# Patient Record
Sex: FEMALE | Race: WHITE | NOT HISPANIC OR LATINO | Employment: FULL TIME | URBAN - METROPOLITAN AREA
[De-identification: names, ages, dates, MRNs, and addresses within clinical notes are randomized per-mention and may not be internally consistent; named-entity substitution may affect disease eponyms.]

---

## 2019-02-09 ENCOUNTER — HOSPITAL ENCOUNTER (EMERGENCY)
Facility: HOSPITAL | Age: 34
Discharge: HOME | End: 2019-02-09
Attending: EMERGENCY MEDICINE
Payer: COMMERCIAL

## 2019-02-09 ENCOUNTER — APPOINTMENT (EMERGENCY)
Dept: RADIOLOGY | Facility: HOSPITAL | Age: 34
End: 2019-02-09
Payer: COMMERCIAL

## 2019-02-09 VITALS
SYSTOLIC BLOOD PRESSURE: 124 MMHG | OXYGEN SATURATION: 97 % | TEMPERATURE: 98 F | DIASTOLIC BLOOD PRESSURE: 82 MMHG | HEART RATE: 83 BPM | BODY MASS INDEX: 21.82 KG/M2 | HEIGHT: 71 IN | WEIGHT: 155.87 LBS | RESPIRATION RATE: 16 BRPM

## 2019-02-09 DIAGNOSIS — M25.552 LEFT HIP PAIN: Primary | ICD-10-CM

## 2019-02-09 LAB — EXT PREG TEST URINE: NEGATIVE

## 2019-02-09 PROCEDURE — 99284 EMERGENCY DEPT VISIT MOD MDM: CPT

## 2019-02-09 PROCEDURE — 72100 X-RAY EXAM L-S SPINE 2/3 VWS: CPT

## 2019-02-09 PROCEDURE — 81025 URINE PREGNANCY TEST: CPT | Performed by: PHYSICIAN ASSISTANT

## 2019-02-09 PROCEDURE — 73521 X-RAY EXAM HIPS BI 2 VIEWS: CPT

## 2019-02-09 RX ORDER — LIDOCAINE 50 MG/G
1 PATCH TOPICAL DAILY
Qty: 30 PATCH | Refills: 0 | Status: SHIPPED | OUTPATIENT
Start: 2019-02-09

## 2019-02-09 RX ORDER — IBUPROFEN 400 MG/1
400 TABLET ORAL EVERY 6 HOURS PRN
Qty: 20 TABLET | Refills: 0 | Status: SHIPPED | OUTPATIENT
Start: 2019-02-09 | End: 2019-02-14

## 2019-02-09 RX ORDER — LIDOCAINE 50 MG/G
1 PATCH TOPICAL ONCE
Status: DISCONTINUED | OUTPATIENT
Start: 2019-02-09 | End: 2019-02-09 | Stop reason: HOSPADM

## 2019-02-09 RX ORDER — KETOROLAC TROMETHAMINE 30 MG/ML
15 INJECTION, SOLUTION INTRAMUSCULAR; INTRAVENOUS ONCE
Status: DISCONTINUED | OUTPATIENT
Start: 2019-02-09 | End: 2019-02-09 | Stop reason: HOSPADM

## 2019-02-09 RX ORDER — SENNOSIDES 8.6 MG
650 CAPSULE ORAL EVERY 8 HOURS PRN
Qty: 30 TABLET | Refills: 0 | Status: SHIPPED | OUTPATIENT
Start: 2019-02-09 | End: 2019-02-14

## 2019-02-09 RX ADMIN — LIDOCAINE 1 PATCH: 50 PATCH TOPICAL at 02:46

## 2019-02-09 NOTE — DISCHARGE INSTRUCTIONS
Take Tylenol and Motrin as indicated  Use cane as indicated  Follow-up with PTT  Follow-up with PCP  Follow up with emergency department if symptoms persist or exacerbate    Hip Pain   WHAT YOU NEED TO KNOW:   Hip pain can be caused by a number of conditions, such as bursitis, arthritis, or muscle or tendon strain  X-rays do not show broken bones  You may have swelling in the fluid-filled sacs that protect your muscles and tendons  Hip pain can also be caused by a lower back problem  Hip pain may be caused by trauma, playing sports, or running  Your pain may start in your hip and go to your thigh, buttock, or groin  DISCHARGE INSTRUCTIONS:   Medicines:   · NSAIDs , such as ibuprofen, help decrease swelling, pain, and fever  This medicine is available with or without a doctor's order  NSAIDs can cause stomach bleeding or kidney problems in certain people  If you take blood thinner medicine, always ask your healthcare provider if NSAIDs are safe for you  Always read the medicine label and follow directions  · Take your medicine as directed  Contact your healthcare provider if you think your medicine is not helping or if you have side effects  Tell him of her if you are allergic to any medicine  Keep a list of the medicines, vitamins, and herbs you take  Include the amounts, and when and why you take them  Bring the list or the pill bottles to follow-up visits  Carry your medicine list with you in case of an emergency  Return to the emergency department if:   · Your pain gets worse  · You have numbness in your leg or toes  · You cannot put any weight on or move your hip  Contact your healthcare provider if:   · You have a fever  · Your pain does not decrease, even after treatment  · You have questions or concerns about your condition or care  Follow up with your healthcare provider as directed: You may need physical therapy, an injection, or more testing   You may need to see an orthopedic specialist  Write down your questions so you remember to ask them during your visits  Manage your hip pain:   · Rest  your injured hip so that it can heal  You may need to avoid putting any weight on your hip for at least 48 hours  Return to normal activities as directed  · Ice  the injury for 20 minutes every 4 hours, or as directed  Use an ice pack, or put crushed ice in a plastic bag  Cover it with a towel to protect your skin  Ice helps prevent tissue damage and decreases swelling and pain  · Elevate  your injured hip above the level of your heart as often as you can  This will help decrease swelling and pain  If possible, prop your hip and leg on pillows or blankets to keep the area elevated comfortably  · Maintain a healthy weight  Extra body weight can cause pressure and pain in your hip, knee, and ankle joints  Ask your healthcare provider how much you should weigh  Ask him to help you create a weight loss plan if you are overweight  · Use assistive devices as directed  You may need to use a cane or crutches  Assistive devices help decrease pain and pressure on your hip when you walk  Ask your healthcare provider for more information about assistive devices and how to use them correctly  © 2017 [address] [facility] Information is for End User's use only and may not be sold, redistributed or otherwise used for commercial purposes  All illustrations and images included in CareNotes® are the copyrighted property of [employer]  or Balaji Agu[employer]  The above information is an  only  It is not intended as medical advice for individual conditions or treatments  Talk to your doctor, nurse or pharmacist before following any medical regimen to see if it is safe and effective for you  Hip Pain   WHAT YOU NEED TO KNOW:   What causes hip pain? Hip pain can be caused by a number of conditions, such as bursitis, arthritis, or muscle or tendon strain   You may have swelling in the fluid-filled sacs that protect your muscles and tendons  Hip pain can also be caused by a lower back problem  Hip pain may be caused by trauma, playing sports, or running  Your pain may start in your hip and go to your thigh, buttock, or groin  How is hip pain treated? You may need x-rays to make sure there are no broken bones  You may be given NSAIDs to help decrease pain and swelling  How can I manage hip pain? · Rest  your injured hip so that it can heal  You may need to avoid putting any weight on your hip for at least 48 hours  Return to normal activities as directed  · Ice  the injury for 20 minutes every 4 hours, or as directed  Use an ice pack, or put crushed ice in a plastic bag  Cover it with a towel to protect your skin  Ice helps prevent tissue damage and decreases swelling and pain  · Elevate  your injured hip above the level of your heart as often as you can  This will help decrease swelling and pain  If possible, prop your hip and leg on pillows or blankets to keep the area elevated comfortably  · Maintain a healthy weight  Extra body weight can cause pressure and pain in your hip, knee, and ankle joints  Ask your healthcare provider how much you should weigh  Ask him to help you create a weight loss plan if you are overweight  · Use assistive devices as directed  You may need to use a cane or crutches  Assistive devices help decrease pain and pressure on your hip when you walk  Ask your healthcare provider for more information about assistive devices and how to use them correctly  When should I seek immediate care? · Your pain gets worse  · You have numbness in your leg or toes  · You cannot put any weight on or move your hip  When should I contact my healthcare provider? · You have a fever  · Your pain does not decrease, even after treatment  · You have questions or concerns about your condition or care    CARE AGREEMENT:   You have the right to help plan your care  Learn about your health condition and how it may be treated  Discuss treatment options with your caregivers to decide what care you want to receive  You always have the right to refuse treatment  The above information is an  only  It is not intended as medical advice for individual conditions or treatments  Talk to your doctor, nurse or pharmacist before following any medical regimen to see if it is safe and effective for you  © 2017 [employer]  Information is for End User's use only and may not be sold, redistributed or otherwise used for commercial purposes  All illustrations and images included in CareNotes® are the copyrighted property of [employer]  or Balaji Aguilar

## 2019-02-09 NOTE — ED PROVIDER NOTES
History  Chief Complaint   Patient presents with    Fall     pt states she fell sown about 4-5 steps and landed on left side, c/o left lower back pain, denies N/T     Patient is a 65-year-old female history of appendectomy that presents emergency department with persistent achy nonradiating left hip pain for 2 hours  Patient states that she was walking down the steps of her home into her basement to check on her children, when she slipped, and landed on her left pelvis and slid down 4-5 stairs  Patient denies hitting her head  Patient was able to ambulate on scene  Patient presents emergency department to be evaluated  Patient verbalizes a history of lower back injury playing sports while in high school  Patient denies taking any pharmacological pain management prior to presentation  Patient affirms rest as other factor, with provocative factor of pressure to left pelvis and ambulation on left side  Patient denies fevers, chills, nausea, and vomiting  Patient denies diarrhea, constipation or urinary symptoms  Patient denies numbness, tingling, and loss of power  Patient denies loss of consciousness, vision changes, and photophobia  Patient denies sick contacts or recent travel  Patient denies chest pain, shortness of breath, and abdominal pain    Patient has acute distress        History provided by:  Patient   used: No    Hip Pain   Location:  Left hip  Quality:  Achy and dull  Severity:  Mild  Onset quality:  Sudden  Duration:  2 hours  Timing:  Constant  Progression:  Unchanged  Chronicity:  New  Context:  Patient fell down 4 to 5 stairs on left hip  Relieved by:  No movement  Worsened by:  Weight-bearing on left lower extremity and pressure to left hip  Ineffective treatments:  None tried  Associated symptoms: no abdominal pain, no chest pain, no cough, no diarrhea, no fever, no headaches, no nausea, no rash, no shortness of breath and no vomiting        None       History reviewed  No pertinent past medical history  Past Surgical History:   Procedure Laterality Date    APPENDECTOMY         History reviewed  No pertinent family history  I have reviewed and agree with the history as documented  Social History     Tobacco Use    Smoking status: Current Some Day Smoker    Smokeless tobacco: Never Used   Substance Use Topics    Alcohol use: No    Drug use: No        Review of Systems   Constitutional: Negative for activity change, appetite change, chills and fever  Eyes: Negative for photophobia and visual disturbance  Respiratory: Negative for cough, chest tightness and shortness of breath  Cardiovascular: Negative for chest pain and palpitations  Gastrointestinal: Negative for abdominal pain, constipation, diarrhea, nausea and vomiting  Genitourinary: Negative for difficulty urinating, dysuria and frequency  Musculoskeletal: Negative for back pain, gait problem, neck pain and neck stiffness  Patient has left hip pain status post fall   Skin: Negative for pallor and rash  Allergic/Immunologic: Negative for environmental allergies and food allergies  Neurological: Negative for dizziness, weakness, numbness and headaches  Psychiatric/Behavioral: Negative for confusion  All other systems reviewed and are negative  Physical Exam  Physical Exam   Constitutional: She is oriented to person, place, and time  She appears well-developed and well-nourished  She is active and cooperative  Non-toxic appearance  She does not have a sickly appearance  She does not appear ill  No distress  HENT:   Head: Normocephalic and atraumatic  Right Ear: Hearing, tympanic membrane, external ear and ear canal normal  No drainage, swelling or tenderness  No mastoid tenderness  No decreased hearing is noted  Left Ear: Hearing, tympanic membrane, external ear and ear canal normal  No drainage, swelling or tenderness  No mastoid tenderness   No decreased hearing is noted    Nose: Nose normal    Mouth/Throat: Uvula is midline, oropharynx is clear and moist and mucous membranes are normal    Eyes: Pupils are equal, round, and reactive to light  Conjunctivae, EOM and lids are normal  Right eye exhibits no discharge  Left eye exhibits no discharge  Neck: Trachea normal, normal range of motion, full passive range of motion without pain and phonation normal  Neck supple  No JVD present  No tracheal tenderness, no spinous process tenderness and no muscular tenderness present  No neck rigidity  No tracheal deviation and normal range of motion present  Passive ROM intact  Neurovascularly intact  No grinding or clicking of joints     Cardiovascular: Normal rate, regular rhythm, normal heart sounds, intact distal pulses and normal pulses  Pulses:       Radial pulses are 2+ on the right side, and 2+ on the left side  Posterior tibial pulses are 2+ on the right side, and 2+ on the left side  Pulmonary/Chest: Effort normal and breath sounds normal  No stridor  She has no decreased breath sounds  She has no wheezes  She has no rhonchi  She has no rales  She exhibits no tenderness, no bony tenderness and no crepitus  Abdominal: Soft  Bowel sounds are normal  She exhibits no distension  There is no tenderness  There is no rigidity, no rebound, no guarding and no CVA tenderness  Musculoskeletal:        Left hip: She exhibits decreased range of motion and tenderness  She exhibits normal strength, no bony tenderness, no swelling, no crepitus, no deformity and no laceration  Passive ROM intact  Upper and lower extremity 5/5 bilaterally  Neurovascularly intact  No grinding or clicking of joints    Patient able to bear weight on left lower extremity and ambulate with assistance from her      Lymphadenopathy:        Head (right side): No submental, no submandibular, no tonsillar, no preauricular, no posterior auricular and no occipital adenopathy present          Head (left side): No submental, no submandibular, no tonsillar, no preauricular, no posterior auricular and no occipital adenopathy present  She has no cervical adenopathy  Right cervical: No superficial cervical, no deep cervical and no posterior cervical adenopathy present  Left cervical: No superficial cervical, no deep cervical and no posterior cervical adenopathy present  Neurological: She is alert and oriented to person, place, and time  She has normal strength and normal reflexes  No cranial nerve deficit (cn 2-12 intact) or sensory deficit  Coordination normal  GCS eye subscore is 4  GCS verbal subscore is 5  GCS motor subscore is 6  Reflex Scores:       Patellar reflexes are 2+ on the right side and 2+ on the left side  No neurological deficit noted on exam; neurovascularly intact    Patient with gait favoring right lower extremity secondary to pain with weight-bearing on left lower extremity      Skin: Skin is warm and intact  Capillary refill takes less than 2 seconds  She is not diaphoretic  No evidence of ecchymosis or abrasion on left pelvis or lumbar spine bilaterally at site of left iliac crest tenderness   Psychiatric: She has a normal mood and affect  Her speech is normal and behavior is normal  Judgment and thought content normal  Cognition and memory are normal    Nursing note and vitals reviewed        Vital Signs  ED Triage Vitals [02/09/19 0045]   Temperature Pulse Respirations Blood Pressure SpO2   98 °F (36 7 °C) 83 16 124/82 97 %      Temp Source Heart Rate Source Patient Position - Orthostatic VS BP Location FiO2 (%)   Oral Monitor Lying Right arm --      Pain Score       3           Vitals:    02/09/19 0045   BP: 124/82   Pulse: 83   Patient Position - Orthostatic VS: Lying       Visual Acuity      ED Medications  Medications - No data to display    Diagnostic Studies  Results Reviewed     Procedure Component Value Units Date/Time    POCT pregnancy, urine [[id number]] (Normal) Resulted:  02/09/19 0140    Lab Status:  Final result Updated:  02/09/19 0140     EXT PREG TEST UR (Ref: Negative) negative                 XR hips bilateral 2 vw w pelvis if performed   Final Result by Neisha Diaz MD (02/09 0228)      No acute osseous abnormality  Workstation performed: [id number]         XR spine lumbar 2 or 3 views injury   Final Result by Neisha Diaz MD (02/09 0228)      Normal examination           Workstation performed: [id number]                    Procedures  Procedures       Phone Contacts  ED Phone Contact    ED Course                               MDM  Number of Diagnoses or Management Options  Left hip pain: new and does not require workup  Diagnosis management comments: Lumbar spine x-ray initial read with no osseous abnormality  Left hip x-ray initial read with no osseous abnormality  Negative urine pregnancy  Ice was delivered to left iliac crest by ED RN  Delivered Lidoderm patch and Toradol emergency department; patient verbalizes decrease in presenting left hip pain symptomatology status post medication delivery  Prescribed Tylenol, Motrin, and Lidoderm patch and counseled patient on medication administration and side effects  Ordered patient cane for self ambulation  ED tech counseled patient on proper mechanics of cane and watched patient mechanically demonstrate proper ambulation technique with cane in ED prior to discharge  Follow-up with physical therapy  Follow-up with PCP  Follow up with emergency department symptoms persist or exacerbate  Patient demonstrates verbal understanding of all imaging findings, discharge instructions, and follow-up         Amount and/or Complexity of Data Reviewed  Clinical lab tests: ordered and reviewed  Tests in the radiology section of CPT®: ordered and reviewed  Review and summarize past medical records: yes    Risk of Complications, Morbidity, and/or Mortality  Presenting problems: minimal    Patient Progress  Patient progress: stable      Disposition  Final diagnoses:   Left hip pain     Time reflects when diagnosis was documented in both MDM as applicable and the Disposition within this note     Time User Action Codes Description Comment    2/9/2019  2:21 AM Daryle Gouge Add [M25 [id number]] Left hip pain       ED Disposition     ED Disposition Condition Date/Time Comment    Discharge  Sat Feb 9, 2019  2:21 AM Cayetano Lowery discharge to home/self care      Condition at discharge: Stable        Follow-up Information     Follow up With Specialties Details Why Contact Info Additional [address] Call in 3 days for further evaluation of symptoms [address] [facility] [address] [phone] [facility], [address] [place], [place], [address]    Physical Therapy at [facility] Physical Therapy Call in 3 days for further evaluation of symptoms [facility] [address] [facility] [address]  [phone] AN  [facility], [place], [place], [address] [facility] Emergency Department Emergency Medicine Go to As needed [address] [facility] [address] [phone] [place] ED, [address] [facility], [place], [place], [address]          Discharge Medication List as of 2/9/2019  2:39 AM      START taking these medications    Details   acetaminophen (TYLENOL) 650 mg CR tablet Take 1 tablet (650 mg total) by mouth every 8 (eight) hours as needed for mild pain for up to 5 days, Starting Sat 2/9/2019, Until Thu 2/14/2019, Print      ibuprofen (MOTRIN) 400 mg tablet Take 1 tablet (400 mg total) by mouth every 6 (six) hours as needed for mild pain for up to 5 days, Starting Sat 2/9/2019, Until Thu 2/14/2019, Print      lidocaine (LIDODERM) 5 % Apply 1 patch topically daily Remove & Discard patch within 12 hours or as directed by MD, Starting Sat 2/9/2019, Print No discharge procedures on file      ED Provider  Electronically Signed by           Joyce Kwok PA-C  02/10/19 2007